# Patient Record
Sex: FEMALE | Race: WHITE | NOT HISPANIC OR LATINO | Employment: PART TIME | URBAN - METROPOLITAN AREA
[De-identification: names, ages, dates, MRNs, and addresses within clinical notes are randomized per-mention and may not be internally consistent; named-entity substitution may affect disease eponyms.]

---

## 2022-03-11 ENCOUNTER — TELEPHONE (OUTPATIENT)
Dept: NEUROLOGY | Facility: CLINIC | Age: 47
End: 2022-03-11

## 2022-03-11 NOTE — TELEPHONE ENCOUNTER
Caller: Sasha Machado    Relationship: Self    Best call back number: [phone]    Caller requesting test results:  PT     What test was performed:  SLEEP STUDY     When was the test performed: 02/24/2022    Where was the test performed:   [facility]     Additional notes:  PT WAS UPSET NO ONE HAS CONTACTED HER WOULD LIKE A CALL WITH RESULTS AND  INSTRUCTIONS ON WHAT IS NEXT

## 2022-03-11 NOTE — TELEPHONE ENCOUNTER
Attempt to contact patient re: results and follow-up.  Mailbox was full, unable to leave message.  Sleep Center staff will try again.

## 2022-06-27 ENCOUNTER — TELEPHONE (OUTPATIENT)
Dept: NEUROLOGY | Facility: CLINIC | Age: 47
End: 2022-06-27

## 2022-06-27 NOTE — TELEPHONE ENCOUNTER
Caller: ALAN Garcia call back number: [phone]    What was the call regarding: PT CALLING SAID SHE GETS NUMB FROM HER RIGHT SIDE OF FACE TO BELT AREA , FEEL'S  LIKE GOING TO SLEEP . SHE CAN STILL LIFT ARM. NEEDED TO KNOW IF THIS HAS TO TO DO WITH SLEEP APPENA ? AND DOES SHE NEED TO MAKE A SEPARATE APPT? IF SO WE CAN'T GET HER IN UNTIL JAN. 2023  HOWEVER SHE HAS SLEEP APPT July 27.22          Do you require a callback: YES IF YOU CAN GIVE ME ANY ADVICE ON WHAT IS GOING ON AND ABOUT APPT.     PLEASE ADVISE.

## 2022-06-28 RX ORDER — HYDROXYZINE PAMOATE 50 MG/1
CAPSULE ORAL
COMMUNITY
Start: 2022-03-04 | End: 2022-07-21

## 2022-06-28 NOTE — TELEPHONE ENCOUNTER
NUMB FROM HER RIGHT SIDE OF FACE TO BELT AREA , FEEL'S  LIKE GOING TO SLEEP . SHE CAN STILL LIFT ARM      The above complaint would have nothing to do with sleep apnea    Suggest she see her pcp about the above     Would need a separate appt here for new neurology problem

## 2023-12-28 DIAGNOSIS — F41.0 PANIC DISORDER: ICD-10-CM

## 2023-12-28 DIAGNOSIS — F43.10 POST TRAUMATIC STRESS DISORDER (PTSD): ICD-10-CM

## 2023-12-28 DIAGNOSIS — F51.04 PSYCHOPHYSIOLOGICAL INSOMNIA: ICD-10-CM

## 2023-12-28 RX ORDER — CLONAZEPAM 0.5 MG/1
0.5 TABLET ORAL 2 TIMES DAILY PRN
Qty: 60 TABLET | Refills: 2 | Status: SHIPPED | OUTPATIENT
Start: 2023-12-28

## 2023-12-28 RX ORDER — QUETIAPINE FUMARATE 100 MG/1
TABLET, FILM COATED ORAL
Qty: 60 TABLET | Refills: 3 | Status: SHIPPED | OUTPATIENT
Start: 2023-12-28

## 2023-12-28 RX ORDER — LAMOTRIGINE 100 MG/1
100 TABLET ORAL 2 TIMES DAILY
Qty: 180 TABLET | Refills: 1 | Status: SHIPPED | OUTPATIENT
Start: 2023-12-28

## 2023-12-28 RX ORDER — DULOXETIN HYDROCHLORIDE 60 MG/1
60 CAPSULE, DELAYED RELEASE ORAL 2 TIMES DAILY
Qty: 180 CAPSULE | Refills: 1 | Status: SHIPPED | OUTPATIENT
Start: 2023-12-28

## 2023-12-28 RX ORDER — ARIPIPRAZOLE 10 MG/1
10 TABLET ORAL
Qty: 90 TABLET | Refills: 1 | Status: SHIPPED | OUTPATIENT
Start: 2023-12-28

## 2023-12-28 RX ORDER — TEMAZEPAM 30 MG/1
30 CAPSULE ORAL NIGHTLY PRN
Qty: 30 CAPSULE | Refills: 2 | Status: SHIPPED | OUTPATIENT
Start: 2023-12-28

## 2024-01-11 RX ORDER — GABAPENTIN 600 MG/1
600 TABLET ORAL
Qty: 30 TABLET | Refills: 1 | Status: SHIPPED | OUTPATIENT
Start: 2024-01-11

## 2024-02-07 ENCOUNTER — TELEMEDICINE (OUTPATIENT)
Dept: PSYCHIATRY | Facility: CLINIC | Age: 49
End: 2024-02-07
Payer: COMMERCIAL

## 2024-02-07 DIAGNOSIS — F33.1 MODERATE EPISODE OF RECURRENT MAJOR DEPRESSIVE DISORDER: Primary | Chronic | ICD-10-CM

## 2024-02-07 DIAGNOSIS — F51.04 PSYCHOPHYSIOLOGICAL INSOMNIA: Chronic | ICD-10-CM

## 2024-02-07 DIAGNOSIS — F41.0 PANIC DISORDER: Chronic | ICD-10-CM

## 2024-02-07 DIAGNOSIS — F43.10 POST TRAUMATIC STRESS DISORDER (PTSD): ICD-10-CM

## 2024-02-07 PROCEDURE — 99214 OFFICE O/P EST MOD 30 MIN: CPT | Performed by: PHYSICIAN ASSISTANT
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
## 2024-02-15 NOTE — PROGRESS NOTES
"Chief Complaint  Sleep Apnea    Subjective          Sasha Dillon presents to [facility] NEUROLOGY  History of Present Illness   LUDMILA F/U patient states she is not benefiting from pap therapy she states she uses her pap machine once a week  due to when she does use machine she doesn't get the rest she needs  ,she uses nasal pillows and goes through [facility] for supplies.    slept well with BiPAP, when able to use but has trouble sleeping so does not try to use most nights     Sleep testing history:    On NPSG at [facility] , 2/23/22 patient had Moderate obstructive sleep apnea syndrome with apnea-hypopnea index of 17.3 per sleep hour, minimum SpO2 of 68%     On NPSG at [facility] , 3/29/22 CPAP/BIPAP TITRATION AHI:2.9/HR minimum SpO2 of 85%    PAP download: not available           [place] Sleepiness Scale:  Sitting and reading 0 WatchingTV 1  Sitting, inactive, in a public place 0  As a passenger in a car for 1 hour w/o a break  2  Lying down to rest in the afternoon  3  Sitting and talking to someone  1  Sitting quietly after a lunch  1  In a car, while stopped for traffic or a light  0  Total 8    Review of Systems   Constitutional:  Positive for fatigue and unexpected weight change.   HENT:  Positive for sore throat.    Respiratory:  Positive for apnea, shortness of breath and wheezing.    Musculoskeletal:  Positive for back pain and gait problem.   Allergic/Immunologic: Positive for environmental allergies.   Psychiatric/Behavioral:  Positive for decreased concentration. The patient is nervous/anxious.    All other systems reviewed and are negative.        Objective   Vital Signs:   /82   Pulse 98   Ht 149.9 cm (59.02\")   Wt 103 kg (228 lb)   BMI 46.02 kg/m²     Physical Exam  Vitals reviewed.   Constitutional:       Appearance: Normal appearance.   Cardiovascular:      Rate and Rhythm: Normal rate.      Pulses: Normal pulses.   Pulmonary:      Effort: Pulmonary effort is normal. No respiratory " distress.   Neurological:      General: No focal deficit present.      Mental Status: She is alert and oriented to person, place, and time.   Psychiatric:         Mood and Affect: Mood normal.        Result Review :                 Assessment and Plan    Diagnoses and all orders for this visit:    1. Obstructive sleep apnea syndrome (Primary)  Overview:  Last Assessment & Plan:   Formatting of this note might be different from the original.  Wears CPAP. Sees neurologist for this.      2. Psychophysiological insomnia    3. Restless legs syndrome (RLS)  -     gabapentin (NEURONTIN) 600 MG tablet; Take 1 tablet by mouth every night at bedtime.  Dispense: 90 tablet; Refill: 3        Continue clonazepam and temazepam for insomnia, try to use BiPAP nightly, if doing well then call and we will obtain a download from your machine  Continue gabapentin for rls         Follow Up   Return in about 1 year (around 2/19/2025).    Patient was given instructions and counseling regarding her condition or for health maintenance advice. Please see specific information pulled into the AVS if appropriate.       This document has been electronically signed by Joseph Seipel, MD on February 19, 2024 15:45 EST

## 2024-02-19 ENCOUNTER — OFFICE VISIT (OUTPATIENT)
Dept: NEUROLOGY | Facility: CLINIC | Age: 49
End: 2024-02-19
Payer: COMMERCIAL

## 2024-02-19 VITALS
HEART RATE: 98 BPM | SYSTOLIC BLOOD PRESSURE: 115 MMHG | BODY MASS INDEX: 45.96 KG/M2 | DIASTOLIC BLOOD PRESSURE: 82 MMHG | WEIGHT: 228 LBS | HEIGHT: 59 IN

## 2024-02-19 DIAGNOSIS — G47.33 OBSTRUCTIVE SLEEP APNEA SYNDROME: Primary | ICD-10-CM

## 2024-02-19 DIAGNOSIS — G25.81 RESTLESS LEGS SYNDROME (RLS): ICD-10-CM

## 2024-02-19 DIAGNOSIS — F51.04 PSYCHOPHYSIOLOGICAL INSOMNIA: Chronic | ICD-10-CM

## 2024-02-19 PROCEDURE — 99214 OFFICE O/P EST MOD 30 MIN: CPT | Performed by: PSYCHIATRY & NEUROLOGY

## 2024-02-19 RX ORDER — FEXOFENADINE HCL 180 MG/1
TABLET ORAL
COMMUNITY

## 2024-02-19 RX ORDER — FAMOTIDINE 20 MG/1
TABLET, FILM COATED ORAL
COMMUNITY

## 2024-02-19 RX ORDER — ALBUTEROL SULFATE 90 UG/1
AEROSOL, METERED RESPIRATORY (INHALATION)
COMMUNITY

## 2024-02-19 RX ORDER — GABAPENTIN 600 MG/1
600 TABLET ORAL
Qty: 90 TABLET | Refills: 3 | Status: SHIPPED | OUTPATIENT
Start: 2024-02-19

## 2024-02-19 RX ORDER — AMITRIPTYLINE HYDROCHLORIDE 100 MG/1
TABLET ORAL
COMMUNITY

## 2024-02-19 RX ORDER — ZOLPIDEM TARTRATE 10 MG/1
10 TABLET ORAL DAILY
COMMUNITY
End: 2024-02-19 | Stop reason: ALTCHOICE

## 2024-03-27 DIAGNOSIS — F51.04 PSYCHOPHYSIOLOGICAL INSOMNIA: ICD-10-CM

## 2024-03-27 DIAGNOSIS — F43.10 POST TRAUMATIC STRESS DISORDER (PTSD): ICD-10-CM

## 2024-03-27 DIAGNOSIS — F41.0 PANIC DISORDER: ICD-10-CM

## 2024-03-27 RX ORDER — CLONAZEPAM 0.5 MG/1
0.5 TABLET ORAL 2 TIMES DAILY PRN
Qty: 60 TABLET | Refills: 2 | Status: SHIPPED | OUTPATIENT
Start: 2024-03-27

## 2024-03-27 RX ORDER — TEMAZEPAM 30 MG/1
30 CAPSULE ORAL NIGHTLY PRN
Qty: 30 CAPSULE | Refills: 2 | Status: SHIPPED | OUTPATIENT
Start: 2024-03-27

## 2024-05-08 ENCOUNTER — TELEMEDICINE (OUTPATIENT)
Dept: PSYCHIATRY | Facility: CLINIC | Age: 49
End: 2024-05-08
Payer: COMMERCIAL

## 2024-05-08 DIAGNOSIS — F41.0 PANIC DISORDER: Chronic | ICD-10-CM

## 2024-05-08 DIAGNOSIS — F43.10 POST TRAUMATIC STRESS DISORDER (PTSD): ICD-10-CM

## 2024-05-08 DIAGNOSIS — F33.1 MODERATE EPISODE OF RECURRENT MAJOR DEPRESSIVE DISORDER: Primary | Chronic | ICD-10-CM

## 2024-05-08 RX ORDER — FLUOXETINE HYDROCHLORIDE 20 MG/1
60 CAPSULE ORAL DAILY
Qty: 270 CAPSULE | Refills: 1 | Status: SHIPPED | OUTPATIENT
Start: 2024-05-08 | End: 2025-05-08

## 2024-05-23 RX ORDER — QUETIAPINE FUMARATE 100 MG/1
TABLET, FILM COATED ORAL
Qty: 60 TABLET | Refills: 2 | Status: SHIPPED | OUTPATIENT
Start: 2024-05-23

## 2024-06-13 ENCOUNTER — TELEPHONE (OUTPATIENT)
Dept: NEUROLOGY | Facility: CLINIC | Age: 49
End: 2024-06-13

## 2024-06-13 NOTE — TELEPHONE ENCOUNTER
Provider: SEIPEL    Caller: MELODY (PHARMACIST)     Pharmacy: [id number]    Phone Number: [phone]    Reason for Call: MELODY WITH [facility] CALLED AND STATES THAT PT HAD ALL MEDICATION SWITCHED TO THEM ARE THEY ARE NEEDING THE DX FOR GABAPENTIN. MELODY IS ALSO WANTING TO KNOW IF PROVIDER IS AWARE THAT ANOTHER PROVIDER HAS PT ON CLONAZEPAM AND TEMAZEPAM.    PLEASE REVIEW AND ADVISE   THANK YOU

## 2024-06-22 RX ORDER — ARIPIPRAZOLE 10 MG/1
10 TABLET ORAL
Qty: 90 TABLET | Refills: 0 | Status: SHIPPED | OUTPATIENT
Start: 2024-06-22

## 2024-06-27 DIAGNOSIS — F51.04 PSYCHOPHYSIOLOGICAL INSOMNIA: ICD-10-CM

## 2024-06-27 RX ORDER — TEMAZEPAM 30 MG/1
30 CAPSULE ORAL NIGHTLY PRN
Qty: 30 CAPSULE | Refills: 2 | Status: SHIPPED | OUTPATIENT
Start: 2024-06-27

## 2024-07-08 DIAGNOSIS — F43.10 POST TRAUMATIC STRESS DISORDER (PTSD): ICD-10-CM

## 2024-07-08 DIAGNOSIS — F41.0 PANIC DISORDER: ICD-10-CM

## 2024-07-09 RX ORDER — CLONAZEPAM 0.5 MG/1
0.5 TABLET ORAL 2 TIMES DAILY PRN
Qty: 60 TABLET | Refills: 1 | Status: SHIPPED | OUTPATIENT
Start: 2024-07-09

## 2024-07-11 RX ORDER — LAMOTRIGINE 100 MG/1
TABLET ORAL
Qty: 180 TABLET | Refills: 0 | Status: SHIPPED | OUTPATIENT
Start: 2024-07-11

## 2024-08-14 ENCOUNTER — TELEPHONE (OUTPATIENT)
Dept: NEUROLOGY | Facility: CLINIC | Age: 49
End: 2024-08-14

## 2024-08-14 DIAGNOSIS — G25.81 RESTLESS LEGS SYNDROME (RLS): ICD-10-CM

## 2024-08-14 NOTE — TELEPHONE ENCOUNTER
URGENT    Medication requested (name and dose):      gabapentin (NEURONTIN) 600 MG tablet   Take 1 tablet by mouth every night at bedtime., Starting Mon 2/19/2024, Normal     Pharmacy where request should be sent:      [address] [phone] [place] [phone] [facility]      Additional details provided by patient:      Best call back number: [phone] (home)       Does the patient have less than a 3 day supply:  [x] Yes  [] No    Lizzy Courtney Rep  08/14/24, 14:44 EDT

## 2024-08-16 RX ORDER — GABAPENTIN 600 MG/1
600 TABLET ORAL
Qty: 90 TABLET | Refills: 3 | Status: SHIPPED | OUTPATIENT
Start: 2024-08-16

## 2024-08-18 RX ORDER — QUETIAPINE FUMARATE 100 MG/1
TABLET, FILM COATED ORAL
Qty: 60 TABLET | Refills: 0 | Status: SHIPPED | OUTPATIENT
Start: 2024-08-18

## 2024-09-04 ENCOUNTER — TELEMEDICINE (OUTPATIENT)
Dept: PSYCHIATRY | Facility: CLINIC | Age: 49
End: 2024-09-04
Payer: COMMERCIAL

## 2024-09-04 DIAGNOSIS — F33.1 MODERATE EPISODE OF RECURRENT MAJOR DEPRESSIVE DISORDER: Primary | Chronic | ICD-10-CM

## 2024-09-04 DIAGNOSIS — F41.0 PANIC DISORDER: Chronic | ICD-10-CM

## 2024-09-04 DIAGNOSIS — F43.10 POST TRAUMATIC STRESS DISORDER (PTSD): ICD-10-CM

## 2024-09-04 DIAGNOSIS — F41.0 PANIC DISORDER: ICD-10-CM

## 2024-09-04 DIAGNOSIS — F43.10 POST TRAUMATIC STRESS DISORDER (PTSD): Chronic | ICD-10-CM

## 2024-09-04 DIAGNOSIS — F51.04 PSYCHOPHYSIOLOGICAL INSOMNIA: Chronic | ICD-10-CM

## 2024-09-04 PROCEDURE — 99214 OFFICE O/P EST MOD 30 MIN: CPT | Performed by: PHYSICIAN ASSISTANT

## 2024-09-04 RX ORDER — ARIPIPRAZOLE 10 MG/1
10 TABLET ORAL
Qty: 90 TABLET | Refills: 1 | Status: SHIPPED | OUTPATIENT
Start: 2024-09-04

## 2024-09-04 RX ORDER — CLONAZEPAM 0.5 MG/1
0.5 TABLET ORAL 2 TIMES DAILY PRN
Qty: 60 TABLET | Refills: 2 | Status: SHIPPED | OUTPATIENT
Start: 2024-09-04
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
## 2024-09-16 DIAGNOSIS — F41.0 PANIC DISORDER: ICD-10-CM

## 2024-09-16 DIAGNOSIS — F43.10 POST TRAUMATIC STRESS DISORDER (PTSD): ICD-10-CM

## 2024-09-17 RX ORDER — CLONAZEPAM 0.5 MG/1
0.5 TABLET ORAL 2 TIMES DAILY PRN
Qty: 60 TABLET | Refills: 2 | Status: SHIPPED | OUTPATIENT
Start: 2024-09-17

## 2024-10-01 RX ORDER — QUETIAPINE FUMARATE 100 MG/1
TABLET, FILM COATED ORAL
Qty: 60 TABLET | Refills: 0 | OUTPATIENT
Start: 2024-10-01

## 2024-10-09 RX ORDER — LAMOTRIGINE 100 MG/1
TABLET ORAL
Qty: 180 TABLET | Refills: 0 | Status: SHIPPED | OUTPATIENT
Start: 2024-10-09

## 2024-10-24 DIAGNOSIS — F51.04 PSYCHOPHYSIOLOGICAL INSOMNIA: ICD-10-CM

## 2024-10-24 RX ORDER — QUETIAPINE FUMARATE 100 MG/1
TABLET, FILM COATED ORAL
Qty: 60 TABLET | Refills: 0 | OUTPATIENT
Start: 2024-10-24

## 2024-10-24 RX ORDER — TEMAZEPAM 30 MG
30 CAPSULE ORAL NIGHTLY PRN
Qty: 30 CAPSULE | Refills: 2 | Status: SHIPPED | OUTPATIENT
Start: 2024-10-24

## 2024-11-18 ENCOUNTER — TELEPHONE (OUTPATIENT)
Dept: PSYCHIATRY | Facility: CLINIC | Age: 49
End: 2024-11-18

## 2024-11-18 NOTE — TELEPHONE ENCOUNTER
Pt called [facility] stating that she's is not sleeping at all.Pt requested something to help her sleep.Please advise

## 2024-12-14 DIAGNOSIS — F43.10 POST TRAUMATIC STRESS DISORDER (PTSD): ICD-10-CM

## 2024-12-14 DIAGNOSIS — F41.0 PANIC DISORDER: ICD-10-CM

## 2024-12-15 DIAGNOSIS — F43.10 POST TRAUMATIC STRESS DISORDER (PTSD): ICD-10-CM

## 2024-12-15 DIAGNOSIS — F41.0 PANIC DISORDER: ICD-10-CM

## 2024-12-16 RX ORDER — CLONAZEPAM 0.5 MG/1
0.5 TABLET ORAL 2 TIMES DAILY PRN
Qty: 60 TABLET | Refills: 0 | OUTPATIENT
Start: 2024-12-16

## 2024-12-16 RX ORDER — CLONAZEPAM 0.5 MG/1
0.5 TABLET ORAL 2 TIMES DAILY PRN
Qty: 60 TABLET | Refills: 2 | Status: SHIPPED | OUTPATIENT
Start: 2024-12-16

## 2025-01-06 ENCOUNTER — TELEMEDICINE (OUTPATIENT)
Dept: PSYCHIATRY | Facility: CLINIC | Age: 50
End: 2025-01-06
Payer: MEDICAID

## 2025-01-06 DIAGNOSIS — F43.10 POST TRAUMATIC STRESS DISORDER (PTSD): ICD-10-CM

## 2025-01-06 DIAGNOSIS — F41.0 PANIC DISORDER: ICD-10-CM

## 2025-01-06 DIAGNOSIS — F33.1 MODERATE EPISODE OF RECURRENT MAJOR DEPRESSIVE DISORDER: Primary | ICD-10-CM

## 2025-01-06 DIAGNOSIS — F51.04 PSYCHOPHYSIOLOGICAL INSOMNIA: ICD-10-CM

## 2025-01-06 RX ORDER — LAMOTRIGINE 100 MG/1
100 TABLET ORAL 2 TIMES DAILY
Qty: 180 TABLET | Refills: 1 | Status: SHIPPED | OUTPATIENT
Start: 2025-01-06

## 2025-01-06 RX ORDER — TRAZODONE HYDROCHLORIDE 50 MG/1
50-100 TABLET, FILM COATED ORAL NIGHTLY
Qty: 60 TABLET | Refills: 2 | Status: SHIPPED | OUTPATIENT
Start: 2025-01-06
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
## 2025-02-18 RX ORDER — ARIPIPRAZOLE 10 MG/1
10 TABLET ORAL
Qty: 90 TABLET | Refills: 1 | Status: SHIPPED | OUTPATIENT
Start: 2025-02-18

## 2025-03-17 DIAGNOSIS — F43.10 POST TRAUMATIC STRESS DISORDER (PTSD): ICD-10-CM

## 2025-03-17 DIAGNOSIS — F41.0 PANIC DISORDER: ICD-10-CM

## 2025-03-17 RX ORDER — CLONAZEPAM 0.5 MG/1
0.5 TABLET ORAL 2 TIMES DAILY PRN
Qty: 60 TABLET | Refills: 2 | Status: SHIPPED | OUTPATIENT
Start: 2025-03-17

## 2025-04-05 RX ORDER — TRAZODONE HYDROCHLORIDE 50 MG/1
TABLET ORAL
Qty: 180 TABLET | Refills: 1 | Status: SHIPPED | OUTPATIENT
Start: 2025-04-05

## 2025-04-07 ENCOUNTER — TELEMEDICINE (OUTPATIENT)
Dept: PSYCHIATRY | Facility: CLINIC | Age: 50
End: 2025-04-07
Payer: MEDICAID

## 2025-04-07 DIAGNOSIS — F41.0 PANIC DISORDER: Chronic | ICD-10-CM

## 2025-04-07 DIAGNOSIS — F43.10 POST TRAUMATIC STRESS DISORDER (PTSD): Chronic | ICD-10-CM

## 2025-04-07 DIAGNOSIS — F33.1 MODERATE EPISODE OF RECURRENT MAJOR DEPRESSIVE DISORDER: Primary | Chronic | ICD-10-CM

## 2025-04-07 DIAGNOSIS — F51.04 PSYCHOPHYSIOLOGICAL INSOMNIA: Chronic | ICD-10-CM
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
## 2025-04-18 DIAGNOSIS — F51.04 PSYCHOPHYSIOLOGICAL INSOMNIA: ICD-10-CM

## 2025-04-21 ENCOUNTER — PRIOR AUTHORIZATION (OUTPATIENT)
Dept: PSYCHIATRY | Facility: CLINIC | Age: 50
End: 2025-04-21
Payer: MEDICAID

## 2025-04-21 RX ORDER — TEMAZEPAM 30 MG/1
30 CAPSULE ORAL NIGHTLY PRN
Qty: 30 CAPSULE | Refills: 2 | Status: SHIPPED | OUTPATIENT
Start: 2025-04-21

## 2025-04-23 ENCOUNTER — TELEPHONE (OUTPATIENT)
Dept: PSYCHIATRY | Facility: CLINIC | Age: 50
End: 2025-04-23
Payer: MEDICAID

## 2025-04-23 NOTE — TELEPHONE ENCOUNTER
Patient states she is having increased anxiety for the past month or so. Patient stated she feels either the medication she is currently taking is not work or is not strong enough. Patient denied having any SI/HI at this time. Patient was informed if that changes to go to the ED or call 911.  Patient states the medication she is currently taking does help her with sleep, but that she needs something in the evening she feels to help with the anxiety.    Please advise

## 2025-04-28 NOTE — TELEPHONE ENCOUNTER
She told me she is taking 2 in morning. Do you want me to remind her to take 1 in am 1 at noon and then start 1 at night?

## 2025-04-28 NOTE — TELEPHONE ENCOUNTER
Patient has been made aware of providers message that she should be taking one in am, one at noon and that she can take take one tablet at night if needed. Patient verbalized her understanding.

## 2025-04-28 NOTE — TELEPHONE ENCOUNTER
So just to clarify you want her to take a third of a tablet at night or take a third tablet since she is already taking two daily.     Please advise

## 2025-05-05 ENCOUNTER — PRIOR AUTHORIZATION (OUTPATIENT)
Dept: PSYCHIATRY | Facility: CLINIC | Age: 50
End: 2025-05-05
Payer: MEDICAID

## 2025-05-05 ENCOUNTER — TELEPHONE (OUTPATIENT)
Dept: PSYCHIATRY | Facility: CLINIC | Age: 50
End: 2025-05-05
Payer: MEDICAID

## 2025-05-05 DIAGNOSIS — F41.0 PANIC DISORDER: ICD-10-CM

## 2025-05-05 DIAGNOSIS — F43.10 POST TRAUMATIC STRESS DISORDER (PTSD): ICD-10-CM

## 2025-05-05 RX ORDER — CLONAZEPAM 0.5 MG/1
0.5 TABLET ORAL 3 TIMES DAILY PRN
Qty: 90 TABLET | Refills: 2 | Status: SHIPPED | OUTPATIENT
Start: 2025-05-05

## 2025-05-05 NOTE — TELEPHONE ENCOUNTER
Pt called [facility] stating that the Klonopin script is supposed to be for taking 3 times day. Please advise

## 2025-05-05 NOTE — TELEPHONE ENCOUNTER
Called [facility] pharmacy cancel script from 3/17/25. Called patient no answer sent [employer] message

## 2025-05-23 ENCOUNTER — TELEPHONE (OUTPATIENT)
Dept: PSYCHIATRY | Facility: CLINIC | Age: 50
End: 2025-05-23
Payer: COMMERCIAL

## 2025-05-23 DIAGNOSIS — F41.0 PANIC DISORDER: ICD-10-CM

## 2025-05-23 DIAGNOSIS — F43.10 POST TRAUMATIC STRESS DISORDER (PTSD): ICD-10-CM

## 2025-05-23 NOTE — TELEPHONE ENCOUNTER
Patient called stating that she is having issues when she stands up with falling over because she gets so dizzy.  Patient feels she is on too much medication and would like to know if Kita can take her off of some of these.  Patient made aware provider is out of office until Tuesday next week.  Patient made aware that if she has an emergency before then or feels this issue doesn't need to wait until then, patient will call PCP or go to the ER.  Please advise.

## 2025-05-27 ENCOUNTER — TELEPHONE (OUTPATIENT)
Dept: PSYCHIATRY | Facility: CLINIC | Age: 50
End: 2025-05-27
Payer: COMMERCIAL

## 2025-05-27 ENCOUNTER — PRIOR AUTHORIZATION (OUTPATIENT)
Dept: PSYCHIATRY | Facility: CLINIC | Age: 50
End: 2025-05-27
Payer: COMMERCIAL

## 2025-05-27 RX ORDER — CLONAZEPAM 0.5 MG/1
0.5 TABLET ORAL 3 TIMES DAILY PRN
Qty: 90 TABLET | Refills: 2 | Status: CANCELLED | OUTPATIENT
Start: 2025-05-27

## 2025-05-27 NOTE — TELEPHONE ENCOUNTER
Pt called states she's out of clonazepam .05 mg.  Request refill. Pt states she's still having trouble and falls over. Pt states she's not sure what's causing these spells. Pt called on 5/23/25 and stated she thinks to much medication.     Please advise

## 2025-05-28 NOTE — TELEPHONE ENCOUNTER
She needs to limit the use of the Klonopin, as it can cause dizziness.  And she can try decreasing the prozac to 40 mg from 60 mg to see if that helps also.

## 2025-07-08 ENCOUNTER — TELEMEDICINE (OUTPATIENT)
Dept: PSYCHIATRY | Facility: CLINIC | Age: 50
End: 2025-07-08
Payer: MEDICAID

## 2025-07-08 DIAGNOSIS — F33.1 MODERATE EPISODE OF RECURRENT MAJOR DEPRESSIVE DISORDER: Primary | ICD-10-CM

## 2025-07-08 DIAGNOSIS — F51.04 PSYCHOPHYSIOLOGICAL INSOMNIA: ICD-10-CM

## 2025-07-08 DIAGNOSIS — F41.0 PANIC DISORDER: ICD-10-CM

## 2025-07-08 DIAGNOSIS — F43.10 POST TRAUMATIC STRESS DISORDER (PTSD): ICD-10-CM

## 2025-07-08 PROCEDURE — 1159F MED LIST DOCD IN RCRD: CPT | Performed by: PHYSICIAN ASSISTANT

## 2025-07-08 PROCEDURE — 99214 OFFICE O/P EST MOD 30 MIN: CPT | Performed by: PHYSICIAN ASSISTANT

## 2025-07-08 PROCEDURE — 1160F RVW MEDS BY RX/DR IN RCRD: CPT | Performed by: PHYSICIAN ASSISTANT

## 2025-07-08 PROCEDURE — 96127 BRIEF EMOTIONAL/BEHAV ASSMT: CPT | Performed by: PHYSICIAN ASSISTANT

## 2025-07-08 RX ORDER — LAMOTRIGINE 100 MG/1
100 TABLET ORAL 2 TIMES DAILY
Qty: 180 TABLET | Refills: 1 | Status: SHIPPED | OUTPATIENT
Start: 2025-07-08

## 2025-07-08 RX ORDER — ARIPIPRAZOLE 10 MG/1
10 TABLET ORAL
Qty: 90 TABLET | Refills: 1 | Status: SHIPPED | OUTPATIENT
Start: 2025-07-08
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
## 2025-07-18 DIAGNOSIS — F43.10 POST TRAUMATIC STRESS DISORDER (PTSD): ICD-10-CM

## 2025-07-18 DIAGNOSIS — F41.0 PANIC DISORDER: ICD-10-CM

## 2025-07-18 NOTE — TELEPHONE ENCOUNTER
Pharmacist Bridget called the [facility] stating that they have been only filling qty of 15 on the Klonopin patients plan will only allow 15. Pharmacist stated that she needs a new script and if you wanted to write it for 15 at a time.Please advise.

## 2025-07-21 RX ORDER — CLONAZEPAM 0.5 MG/1
0.5 TABLET ORAL 3 TIMES DAILY PRN
Qty: 45 TABLET | Refills: 2 | Status: SHIPPED | OUTPATIENT
Start: 2025-07-21

## 2025-08-06 ENCOUNTER — TELEPHONE (OUTPATIENT)
Dept: PSYCHIATRY | Facility: CLINIC | Age: 50
End: 2025-08-06
Payer: MEDICAID

## 2025-08-06 DIAGNOSIS — F51.04 PSYCHOPHYSIOLOGICAL INSOMNIA: Primary | ICD-10-CM

## 2025-08-06 RX ORDER — ZOLPIDEM TARTRATE 10 MG/1
10 TABLET ORAL NIGHTLY PRN
Qty: 30 TABLET | Refills: 1 | Status: SHIPPED | OUTPATIENT
Start: 2025-08-06 | End: 2025-08-07 | Stop reason: SDUPTHER

## 2025-08-07 DIAGNOSIS — F51.04 PSYCHOPHYSIOLOGICAL INSOMNIA: ICD-10-CM

## 2025-08-07 RX ORDER — ZOLPIDEM TARTRATE 10 MG/1
10 TABLET ORAL NIGHTLY PRN
Qty: 30 TABLET | Refills: 1 | Status: SHIPPED | OUTPATIENT
Start: 2025-08-07